# Patient Record
Sex: FEMALE | URBAN - METROPOLITAN AREA
[De-identification: names, ages, dates, MRNs, and addresses within clinical notes are randomized per-mention and may not be internally consistent; named-entity substitution may affect disease eponyms.]

---

## 2023-03-02 ENCOUNTER — APPOINTMENT (OUTPATIENT)
Dept: URBAN - METROPOLITAN AREA CLINIC 264 | Age: 43
Setting detail: DERMATOLOGY
End: 2023-03-02

## 2023-03-02 DIAGNOSIS — Z41.9 ENCOUNTER FOR PROCEDURE FOR PURPOSES OTHER THAN REMEDYING HEALTH STATE, UNSPECIFIED: ICD-10-CM

## 2023-03-02 PROCEDURE — OTHER MEDICAL CONSULTATION: DYSPORT: OTHER

## 2023-03-02 PROCEDURE — OTHER DYSPORT: OTHER

## 2023-03-02 NOTE — PROCEDURE: DYSPORT
Price (Use Numbers Only, No Special Characters Or $): 456 Price (Use Numbers Only, No Special Characters Or $): 474

## 2023-06-15 ENCOUNTER — APPOINTMENT (OUTPATIENT)
Dept: URBAN - METROPOLITAN AREA CLINIC 297 | Age: 43
Setting detail: DERMATOLOGY
End: 2023-06-15

## 2023-06-15 DIAGNOSIS — Z41.9 ENCOUNTER FOR PROCEDURE FOR PURPOSES OTHER THAN REMEDYING HEALTH STATE, UNSPECIFIED: ICD-10-CM

## 2023-06-15 PROCEDURE — OTHER DYSPORT: OTHER

## 2023-06-15 PROCEDURE — OTHER COSMETIC CONSULTATION: DYSPORT: OTHER

## 2023-06-15 NOTE — PROCEDURE: DYSPORT
Price (Use Numbers Only, No Special Characters Or $): 305 Price (Use Numbers Only, No Special Characters Or $): 974

## 2023-12-14 ENCOUNTER — APPOINTMENT (OUTPATIENT)
Dept: URBAN - METROPOLITAN AREA CLINIC 297 | Age: 43
Setting detail: DERMATOLOGY
End: 2023-12-20

## 2023-12-14 DIAGNOSIS — Z41.9 ENCOUNTER FOR PROCEDURE FOR PURPOSES OTHER THAN REMEDYING HEALTH STATE, UNSPECIFIED: ICD-10-CM

## 2023-12-14 PROCEDURE — OTHER COSMETIC CONSULTATION: BOTOX: OTHER

## 2023-12-14 PROCEDURE — OTHER BOTOX: OTHER

## 2023-12-14 NOTE — PROCEDURE: BOTOX
Periorbital Skin Units: 0
Show Ucl Units: No
Additional Area 1 Location: [facility]
Show Additional Area 5: Yes
Additional Area 5 Location: [place]
Consent: Written consent obtained. Risks include but not limited to lid/brow ptosis, bruising, swelling, diplopia, temporary effect, incomplete chemical denervation.
Periorbital Skin Units: 6
Post-Care Instructions: Patient instructed to not lie down for 4 hours and limit physical activity for 24 hours.
Detail Level: Detailed
Incrementing Botox Units: By 0.5 Units
Expiration Date (Month Year): 05/25
Lot #: [id number]
Additional Area 3 Location: Lip Flip
Show Inventory Tab: Show
Forehead Units: 10
Price (Use Numbers Only, No Special Characters Or $): 0.0
Glabellar Complex Units: 12
Additional Area 2 Location: jelly roll
Dilution (U/0.1 Cc): 2.5